# Patient Record
Sex: FEMALE | Race: WHITE | Employment: UNEMPLOYED | URBAN - METROPOLITAN AREA
[De-identification: names, ages, dates, MRNs, and addresses within clinical notes are randomized per-mention and may not be internally consistent; named-entity substitution may affect disease eponyms.]

---

## 2017-03-06 ENCOUNTER — OFFICE VISIT (OUTPATIENT)
Dept: INTERNAL MEDICINE | Age: 37
End: 2017-03-06

## 2017-03-06 VITALS
HEART RATE: 68 BPM | SYSTOLIC BLOOD PRESSURE: 110 MMHG | RESPIRATION RATE: 12 BRPM | BODY MASS INDEX: 23.82 KG/M2 | WEIGHT: 166.4 LBS | DIASTOLIC BLOOD PRESSURE: 68 MMHG | HEIGHT: 70 IN

## 2017-03-06 DIAGNOSIS — G89.29 CHRONIC PAIN OF RIGHT KNEE: ICD-10-CM

## 2017-03-06 DIAGNOSIS — K21.9 GASTROESOPHAGEAL REFLUX DISEASE WITHOUT ESOPHAGITIS: ICD-10-CM

## 2017-03-06 DIAGNOSIS — R61 GENERALIZED HYPERHIDROSIS: ICD-10-CM

## 2017-03-06 DIAGNOSIS — M25.561 CHRONIC PAIN OF RIGHT KNEE: ICD-10-CM

## 2017-03-06 DIAGNOSIS — Z01.818 PREOP EXAMINATION: ICD-10-CM

## 2017-03-06 PROCEDURE — 99244 OFF/OP CNSLTJ NEW/EST MOD 40: CPT | Performed by: INTERNAL MEDICINE

## 2017-03-06 ASSESSMENT — ENCOUNTER SYMPTOMS
ROS SKIN COMMENTS: HYPERHYDROSIS
WHEEZING: 0
ABDOMINAL PAIN: 0
RESPIRATORY NEGATIVE: 1
VOMITING: 0
EYES NEGATIVE: 1
CONSTIPATION: 0
ALLERGIC/IMMUNOLOGIC NEGATIVE: 1
ABDOMINAL DISTENTION: 0
NAUSEA: 0
DIARRHEA: 0

## 2017-03-06 ASSESSMENT — PATIENT HEALTH QUESTIONNAIRE - PHQ9
1. LITTLE INTEREST OR PLEASURE IN DOING THINGS: 0
2. FEELING DOWN, DEPRESSED OR HOPELESS: 0
SUM OF ALL RESPONSES TO PHQ QUESTIONS 1-9: 0
SUM OF ALL RESPONSES TO PHQ9 QUESTIONS 1 & 2: 0

## 2018-03-20 ENCOUNTER — OFFICE VISIT (OUTPATIENT)
Dept: INTERNAL MEDICINE | Age: 38
End: 2018-03-20

## 2018-03-20 VITALS
SYSTOLIC BLOOD PRESSURE: 118 MMHG | DIASTOLIC BLOOD PRESSURE: 70 MMHG | BODY MASS INDEX: 24.94 KG/M2 | HEART RATE: 76 BPM | HEIGHT: 70 IN | WEIGHT: 174.2 LBS | RESPIRATION RATE: 16 BRPM

## 2018-03-20 DIAGNOSIS — Z41.1 ENCOUNTER FOR BREAST AUGMENTATION: ICD-10-CM

## 2018-03-20 DIAGNOSIS — G89.29 CHRONIC PAIN OF RIGHT KNEE: ICD-10-CM

## 2018-03-20 DIAGNOSIS — R61 GENERALIZED HYPERHIDROSIS: ICD-10-CM

## 2018-03-20 DIAGNOSIS — Z01.818 PREOP EXAMINATION: ICD-10-CM

## 2018-03-20 DIAGNOSIS — M25.561 CHRONIC PAIN OF RIGHT KNEE: ICD-10-CM

## 2018-03-20 DIAGNOSIS — R61 EXCESSIVE SWEATING: ICD-10-CM

## 2018-03-20 DIAGNOSIS — K21.9 GASTROESOPHAGEAL REFLUX DISEASE WITHOUT ESOPHAGITIS: ICD-10-CM

## 2018-03-20 PROCEDURE — 99244 OFF/OP CNSLTJ NEW/EST MOD 40: CPT | Performed by: INTERNAL MEDICINE

## 2018-03-20 ASSESSMENT — ENCOUNTER SYMPTOMS
VOMITING: 0
CONSTIPATION: 0
RESPIRATORY NEGATIVE: 1
ALLERGIC/IMMUNOLOGIC NEGATIVE: 1
ABDOMINAL PAIN: 0
ABDOMINAL DISTENTION: 0
ROS SKIN COMMENTS: HYPERHYDROSIS
WHEEZING: 0
EYES NEGATIVE: 1
NAUSEA: 0
DIARRHEA: 0

## 2018-03-20 NOTE — ASSESSMENT & PLAN NOTE
Pre op exam and evaluation - risks versus benefits are acceptable for this patients above mentioned procedure for breast augmentation as per Dr Day Bowers

## 2018-03-20 NOTE — COMMUNICATION BODY
Subjective:      Patient ID: Mireya Allen is a 44 y.o. female. Here today for pre op H&P for the following procedure for breast augmentation as per Dr Sanchez Six Prescriptions on File Prior to Visit   Medication Sig Dispense Refill    aluminum chloride (DRYSOL) 20 % external solution Apply topically every morning 1 Bottle 3     No current facility-administered medications on file prior to visit. Allergies  Patient has no known allergies. Review of Systems   Constitutional: Positive for unexpected weight change (up a few # ). Negative for fever. HENT: Positive for congestion and postnasal drip. Eyes: Negative. Respiratory: Negative. Negative for wheezing. Cardiovascular: Negative. Gastrointestinal: Negative for abdominal distention, abdominal pain, constipation, diarrhea, nausea and vomiting. GERD type symptoms    Endocrine: Negative. Genitourinary: Negative. Musculoskeletal: Negative. Skin: Negative. hyperhydrosis   Allergic/Immunologic: Negative. Neurological: Negative. Hematological: Negative. Psychiatric/Behavioral: Negative. Vitals:    03/20/18 0907   BP: 118/70   Pulse: 76   Resp: 16         Objective:   Physical Exam   Constitutional: She is oriented to person, place, and time. She appears well-developed and well-nourished. HENT:   Head: Normocephalic and atraumatic. Right Ear: External ear normal.   Left Ear: External ear normal.   Nose: Nose normal.   Mouth/Throat: Oropharynx is clear and moist.   Eyes: Conjunctivae and EOM are normal. Pupils are equal, round, and reactive to light. Neck: Normal range of motion. Neck supple. No tracheal deviation present. No thyromegaly present. Cardiovascular: Normal rate, regular rhythm, normal heart sounds and intact distal pulses. No murmur heard. Pulmonary/Chest: Effort normal and breath sounds normal.   Abdominal: Soft.  Bowel sounds are normal. Musculoskeletal: Normal range of motion. Neurological: She is alert and oriented to person, place, and time. She has normal reflexes. Skin: Skin is warm and dry. Psychiatric: She has a normal mood and affect.  Her behavior is normal. Thought content normal.       Assessment:              Plan:        Excessive sweating  Controled with Drysol     Generalized hyperhidrosis  Controled with Drysol     Encounter for breast augmentation  Pre op exam and evaluation - risks versus benefits are acceptable for this patients above mentioned procedure for breast augmentation as per Dr Derrick Esparza       Chronic pain of right knee  Pre op exam and evaluation - risks versus benefits are acceptable for this patients above mentioned procedure for breast augmentation as per Dr Derrick Esparza     GERD (gastroesophageal reflux disease)  occ symptoms improved with avoidance

## 2018-03-20 NOTE — PROGRESS NOTES
Musculoskeletal: Normal range of motion. Neurological: She is alert and oriented to person, place, and time. She has normal reflexes. Skin: Skin is warm and dry. Psychiatric: She has a normal mood and affect.  Her behavior is normal. Thought content normal.       Assessment:              Plan:         Excessive sweating  Controled with Drysol     Generalized hyperhidrosis  Controled with Drysol     Encounter for breast augmentation  Pre op exam and evaluation - risks versus benefits are acceptable for this patients above mentioned procedure for breast augmentation as per Dr Derrill Skiff       GERD (gastroesophageal reflux disease)  occ symptoms improved with avoidance     Preop examination  Pre op exam and evaluation - risks versus benefits are acceptable for this patients above mentioned procedure for breast augmentation as per Dr Derrill Skiff

## 2018-03-20 NOTE — LETTER
[facility] [address]  [address] [id number]  Phone: [phone]  Fax: [phone]    Anotnio Tavares MD        March 20, 2018       Patient: Radha Young   MR Number: [id number]   YOB: 1980   Date of Visit: 3/20/2018       Dear Dr. Wick Jessica: Thank you for the request for consultation for Kt Amezcua to me for the evaluation for pre op. Below are the relevant portions of my assessment and plan of care. Subjective:      Patient ID: Radha Young is a 44 y.o. female. Here today for pre op H&P for the following procedure for breast augmentation as per Dr Nilda Llamas Prescriptions on File Prior to Visit   Medication Sig Dispense Refill    aluminum chloride (DRYSOL) 20 % external solution Apply topically every morning 1 Bottle 3     No current facility-administered medications on file prior to visit. Allergies  Patient has no known allergies. Review of Systems   Constitutional: Positive for unexpected weight change (up a few # ). Negative for fever. HENT: Positive for congestion and postnasal drip. Eyes: Negative. Respiratory: Negative. Negative for wheezing. Cardiovascular: Negative. Gastrointestinal: Negative for abdominal distention, abdominal pain, constipation, diarrhea, nausea and vomiting. GERD type symptoms    Endocrine: Negative. Genitourinary: Negative. Musculoskeletal: Negative. Skin: Negative. hyperhydrosis   Allergic/Immunologic: Negative. Neurological: Negative. Hematological: Negative. Psychiatric/Behavioral: Negative. Vitals:    03/20/18 0907   BP: 118/70   Pulse: 76   Resp: 16         Objective:   Physical Exam   Constitutional: She is oriented to person, place, and time. She appears well-developed and well-nourished. HENT:   Head: Normocephalic and atraumatic.    Right Ear: External ear normal.

## 2018-09-26 PROBLEM — Z01.818 PREOP EXAMINATION: Status: RESOLVED | Noted: 2017-03-06 | Resolved: 2018-09-26
